# Patient Record
Sex: FEMALE | Race: WHITE | NOT HISPANIC OR LATINO | URBAN - METROPOLITAN AREA
[De-identification: names, ages, dates, MRNs, and addresses within clinical notes are randomized per-mention and may not be internally consistent; named-entity substitution may affect disease eponyms.]

---

## 2018-01-01 PROBLEM — Z98.890: Noted: 2018-01-01

## 2022-01-11 NOTE — PATIENT DISCUSSION
Advised patient that due to significant hazy posterior view we are unable to determine the health of the retina at this time. Advised patient that YAG procedure should improve vision however, at this time we are unable to determine if any ocular manifestations present that would otherwise effect quality of vision.   Patient understands.

## 2023-01-12 ENCOUNTER — CONSULTATION/EVALUATION (OUTPATIENT)
Facility: LOCATION | Age: 63
End: 2023-01-12

## 2023-01-12 DIAGNOSIS — H25.813: ICD-10-CM

## 2023-01-12 DIAGNOSIS — H01.006: ICD-10-CM

## 2023-01-12 PROCEDURE — 99204 OFFICE O/P NEW MOD 45 MIN: CPT

## 2023-01-12 PROCEDURE — 92136 OPHTHALMIC BIOMETRY: CPT

## 2023-01-12 ASSESSMENT — VISUAL ACUITY
OS_CC: 20/800
OU_SC: J16
OD_SC: 20/400-1
OS_SC: 20/400
OU_SC: 20/100-2
OU_CC: 20/200-1
OD_PAM: 20/30
OS_SC: 20/400-1
OS_AM: 20/40
OD_SC: 20/100
OD_CC: 20/400
OS_CC: 20/400
OU_CC: 20/400
OD_CC: 20/200-1

## 2023-01-12 ASSESSMENT — TONOMETRY
OD_IOP_MMHG: 17
OS_IOP_MMHG: 17